# Patient Record
Sex: FEMALE | Race: WHITE | NOT HISPANIC OR LATINO
[De-identification: names, ages, dates, MRNs, and addresses within clinical notes are randomized per-mention and may not be internally consistent; named-entity substitution may affect disease eponyms.]

---

## 2019-10-09 ENCOUNTER — APPOINTMENT (OUTPATIENT)
Dept: ULTRASOUND IMAGING | Facility: CLINIC | Age: 49
End: 2019-10-09
Payer: COMMERCIAL

## 2019-10-09 ENCOUNTER — APPOINTMENT (OUTPATIENT)
Dept: MAMMOGRAPHY | Facility: CLINIC | Age: 49
End: 2019-10-09
Payer: COMMERCIAL

## 2019-10-09 PROCEDURE — 77067 SCR MAMMO BI INCL CAD: CPT

## 2019-10-09 PROCEDURE — 76641 ULTRASOUND BREAST COMPLETE: CPT | Mod: 50

## 2019-10-09 PROCEDURE — 77063 BREAST TOMOSYNTHESIS BI: CPT

## 2020-11-10 PROBLEM — Z00.00 ENCOUNTER FOR PREVENTIVE HEALTH EXAMINATION: Status: ACTIVE | Noted: 2020-11-10

## 2020-11-12 ENCOUNTER — APPOINTMENT (OUTPATIENT)
Dept: ORTHOPEDIC SURGERY | Facility: CLINIC | Age: 50
End: 2020-11-12
Payer: COMMERCIAL

## 2020-11-12 VITALS
TEMPERATURE: 97.7 F | DIASTOLIC BLOOD PRESSURE: 71 MMHG | BODY MASS INDEX: 26.46 KG/M2 | HEIGHT: 64 IN | HEART RATE: 73 BPM | WEIGHT: 155 LBS | SYSTOLIC BLOOD PRESSURE: 110 MMHG

## 2020-11-12 DIAGNOSIS — Z78.9 OTHER SPECIFIED HEALTH STATUS: ICD-10-CM

## 2020-11-12 PROCEDURE — 73030 X-RAY EXAM OF SHOULDER: CPT | Mod: LT

## 2020-11-12 PROCEDURE — 99072 ADDL SUPL MATRL&STAF TM PHE: CPT

## 2020-11-12 PROCEDURE — 99203 OFFICE O/P NEW LOW 30 MIN: CPT

## 2020-11-12 RX ORDER — MELOXICAM 15 MG/1
15 TABLET ORAL DAILY
Qty: 30 | Refills: 1 | Status: ACTIVE | COMMUNITY
Start: 2020-11-12 | End: 1900-01-01

## 2020-11-12 NOTE — PHYSICAL EXAM
[de-identified] : - Constitutional: This is a female in no obvious distress.  \par - Psych: Patient is alert and oriented to person, place and time.  Patient has a normal mood and affect.\par - Cardiovascular: Normal pulses throughout the upper extremities.  No significant varicosities are noted in the upper extremities. \par - Neuro: Strength and sensation are intact throughout the upper extremities.  Patient has normal coordination.\par - Respiratory:  Patient exhibits no evidence of shortness of breath or difficulty breathing.\par - Skin: No rashes, lesions, or other abnormalities are noted in the upper extremities.\par ---\par \par Examination of her left shoulder demonstrates no focal swelling.  There is no localized tenderness along the AC joint.  She has a negative Neer sign.  She has a mildly positive Cordero sign.  She has full motion.  There is a negative drop arm test.  She is neurovascularly intact distally. [de-identified] : AP and Y radiographs of her left shoulder demonstrate no obvious arthritis but there is a small cyst in the distal clavicle.  There is no evidence of a subacromial spur or glenohumeral joint arthritis.

## 2020-11-12 NOTE — DISCUSSION/SUMMARY
[FreeTextEntry1] : She has findings consistent with left shoulder pain secondary to impingement syndrome.\par \par I had a discussion regarding today's visit, the diagnosis, and treatment recommendations / options.  We discussed either a cortisone injection or a course of anti-inflammatories and a home exercise program.  She agreed to initially try a course of anti-inflammatories and a home exercise program.  She was prescribed meloxicam 15 mg a day for the next 2 weeks or so.  She was warned about potential side effects, including GI.  She was also instructed on home exercise program.  If she is not improving, then she will follow-up for a cortisone injection.\par \par When she was leaving, she showed me her left forearm where she has what appears to be a small lipoma.  It is not bothering her.  I therefore recommended observation.  If it increases in size or she develops symptoms, then she will return to the office in this regard.. \par \par The patient has agreed to this plan of management and has expressed full understanding.  All questions were fully answered to the patient's satisfaction.\par \par Over 50% of the time spent with the patient was on counseling the patient on the above diagnosis, treatment plan and prognosis.\par \par

## 2020-11-12 NOTE — ADDENDUM
[FreeTextEntry1] : I, Quiana Keyes, acted solely as a scribe for Dr. Reeves on this date 11/12/2020.\par \par

## 2020-11-12 NOTE — END OF VISIT
[FreeTextEntry3] : All medical record entries made by the Scribe were at my, Dr. Reeves, direction and personally dictated by me on 11/12/2020. I have reviewed the chart and agree that the record accurately reflects my personal performances of the history, physical exam, assessment, and plan. I have also personally directed, reviewed, and agreed with the chart.\par \par

## 2021-01-06 ENCOUNTER — APPOINTMENT (OUTPATIENT)
Dept: ULTRASOUND IMAGING | Facility: CLINIC | Age: 51
End: 2021-01-06

## 2021-01-06 ENCOUNTER — APPOINTMENT (OUTPATIENT)
Dept: MAMMOGRAPHY | Facility: CLINIC | Age: 51
End: 2021-01-06
Payer: COMMERCIAL

## 2021-01-06 PROCEDURE — 76641 ULTRASOUND BREAST COMPLETE: CPT | Mod: 50

## 2021-01-06 PROCEDURE — 77067 SCR MAMMO BI INCL CAD: CPT

## 2021-01-06 PROCEDURE — 77063 BREAST TOMOSYNTHESIS BI: CPT

## 2021-05-17 ENCOUNTER — APPOINTMENT (OUTPATIENT)
Dept: ORTHOPEDIC SURGERY | Facility: CLINIC | Age: 51
End: 2021-05-17
Payer: COMMERCIAL

## 2021-05-17 VITALS — TEMPERATURE: 97.2 F | HEIGHT: 64 IN | WEIGHT: 155 LBS | BODY MASS INDEX: 26.46 KG/M2

## 2021-05-17 PROCEDURE — 99072 ADDL SUPL MATRL&STAF TM PHE: CPT

## 2021-05-17 PROCEDURE — 99214 OFFICE O/P EST MOD 30 MIN: CPT

## 2021-05-17 NOTE — DISCUSSION/SUMMARY
[FreeTextEntry1] : She has findings consistent with 6 months of right elbow pain secondary to lateral epicondylitis.\par \par I had a discussion regarding today's visit, the prognosis of this diagnosis and treatment recommendations and options.  At this time, I recommended icing, Voltaren gel, bracing at night with a carpal tunnel splint and a tennis elbow strap to wear during the day.  She deferred referral to physical therapy so she was instructed on a home exercise program.  We discussed a possible cortisone injection in the future if her symptoms do not improve after bracing. \par \par The patient has agreed to this plan of management and has expressed full understanding.  All questions were fully answered to the patient's satisfaction.\par \par My cumulative time spent on this patient's visit included: Preparation for the visit, review of the medical records, review of pertinent diagnostic studies, examination and counseling of the patient on the above diagnosis, treatment plan and prognosis, orders of diagnostic tests, medications and/or appropriate procedures and documentation in the medical records of today's visit.

## 2021-05-17 NOTE — PHYSICAL EXAM
[de-identified] : - Constitutional: This is a male female in no obvious distress.  \par - Psych: Patient is alert and oriented to person, place and time.  Patient has a normal mood and affect.\par - Cardiovascular: Normal pulses throughout the upper extremities.  No significant varicosities are noted in the upper extremities. \par - Neuro: Strength and sensation are intact throughout the upper extremities.  Patient has normal coordination.\par - Respiratory:  Patient exhibits no evidence of shortness of breath or difficulty breathing.\par - Skin: No rashes, lesions, or other abnormalities are noted in the upper extremities.\par \par ---\par \par Side: Right Elbow\par -  There is tenderness along the lateral epicondyle.\par -  There is no associated swelling.\par -  There is pain at the lateral epicondyle with resisted wrist extension.  \par -  There is no tenderness along the radial tunnel.  \par -  There is no tenderness along the lateral joint line.\par -  There is no evidence of elbow instability.  \par -  There is no tenderness along the medial epicondyle.  \par -  There is full motion as compared to the contralateral elbow.  \par -  There are no neurovascular deficits noted distally.  \par -  Examination of the shoulder and wrist and hand are within normal limits.

## 2021-05-17 NOTE — ADDENDUM
[FreeTextEntry1] : This note was written by Daniella Menjivar on 05/17/2021 acting solely as a scribe for Dr. Hadley Reeves.\par \par All medical record entries made by the scribe were at my, Dr. Hadley Reeves, direction and personally dictated by me on 05/17/2021. I have personally reviewed the chart and agree that the record accurately reflects my personal performance of the history, physical exam, assessment, and plan

## 2021-06-18 ENCOUNTER — NON-APPOINTMENT (OUTPATIENT)
Age: 51
End: 2021-06-18

## 2021-06-21 PROBLEM — M75.42 IMPINGEMENT SYNDROME OF LEFT SHOULDER: Status: ACTIVE | Noted: 2020-11-12

## 2021-06-28 ENCOUNTER — APPOINTMENT (OUTPATIENT)
Dept: ORTHOPEDIC SURGERY | Facility: CLINIC | Age: 51
End: 2021-06-28
Payer: COMMERCIAL

## 2021-06-28 VITALS — BODY MASS INDEX: 26.46 KG/M2 | TEMPERATURE: 97.5 F | HEIGHT: 64 IN | WEIGHT: 155 LBS

## 2021-06-28 DIAGNOSIS — M75.42 IMPINGEMENT SYNDROME OF LEFT SHOULDER: ICD-10-CM

## 2021-06-28 PROCEDURE — 20551 NJX 1 TENDON ORIGIN/INSJ: CPT | Mod: RT

## 2021-06-28 PROCEDURE — 99072 ADDL SUPL MATRL&STAF TM PHE: CPT

## 2021-06-28 PROCEDURE — 99214 OFFICE O/P EST MOD 30 MIN: CPT | Mod: 25

## 2021-06-28 RX ADMIN — BETAMETHASONE ACETATE AND BETAMETHASONE SODIUM PHOSPHATE 6MG/ML PRESERVATIVE FREE MG/ML: 3; 3 INJECTION, SUSPENSION EPIDURAL; INTRAMUSCULAR; INTRASPINAL; INTRATHECAL at 00:00

## 2021-06-28 RX ADMIN — Medication %: at 00:00

## 2021-06-28 NOTE — DISCUSSION/SUMMARY
[FreeTextEntry1] : I had a discussion regarding today's visit, the diagnosis and treatment recommendations and options.  We also discussed changes since the last visit.  At this time, I recommended a cortisone injection or physical therapy. She agreed to proceed with an injection today.  She does understand that this may not ultimately provide her with long-term relief of her symptoms.\par \par The patient has agreed to the above plan of management and has expressed full understanding.  All questions were fully answered to the patient's satisfaction.\par \par My cumulative time spent on today's visit was greater than 30 minutes and included: Preparation for the visit, review of the medical records, review of pertinent diagnostic studies, examination and counseling of the patient on the above diagnosis, treatment plan and prognosis, orders of diagnostic tests, medications and/or appropriate procedures and documentation in the medical records of today's visit.

## 2021-06-28 NOTE — HISTORY OF PRESENT ILLNESS
[FreeTextEntry1] : Follow-up regarding right elbow pain secondary to lateral epicondylitis.  See note from when she was seen in the office 6 weeks ago.  I recommended icing, Voltaren gel, bracing at night with a carpal tunnel splint and a tennis elbow strap to wear during the day.  She deferred referral to physical therapy.\par \par She returns today in follow-up.  She notes no significant improvement since she was last seen in the office. She has been wearing the brace during the day with relief and she has been wearing a wrist brace at night. She cannot fully extend her elbow. She has sharp shooting pain. She has increased nighttime pain. She rates her pain a 0 out of 10 at rest and a 9/10 with activity\par \par I saw her greater than 6 months ago regarding left shoulder impingement syndrome.  She was prescribed meloxicam 50 mg a day instructed on a home exercise program.

## 2021-06-28 NOTE — PHYSICAL EXAM
[de-identified] : - Constitutional: This is a male female in no obvious distress.  \par - Psych: Patient is alert and oriented to person, place and time.  Patient has a normal mood and affect.\par - Cardiovascular: Normal pulses throughout the upper extremities.  No significant varicosities are noted in the upper extremities. \par - Neuro: Strength and sensation are intact throughout the upper extremities.  Patient has normal coordination.\par - Respiratory:  Patient exhibits no evidence of shortness of breath or difficulty breathing.\par - Skin: No rashes, lesions, or other abnormalities are noted in the upper extremities.\par \par ---\par \par Side: Right Elbow\par -  There is tenderness along the lateral epicondyle.\par -  There is no associated swelling.\par -  There is pain at the lateral epicondyle with resisted wrist extension.  \par -  There is no tenderness along the radial tunnel.  \par -  There is no tenderness along the lateral joint line.\par -  There is no evidence of elbow instability.  \par -  There is no tenderness along the medial epicondyle.  \par -  There is full motion as compared to the contralateral elbow.  \par -  There are no neurovascular deficits noted distally.  \par -  Examination of the shoulder and wrist and hand are within normal limits.

## 2021-06-28 NOTE — ADDENDUM
[FreeTextEntry1] : This note was written by Dainella Menjivar on 06/28/2021 acting solely as a scribe for Dr. Hadley Reeves.\par \par All medical record entries made by the scribe were at my, Dr. Hadley Reeves, direction and personally dictated by me on 06/28/2021. I have personally reviewed the chart and agree that the record accurately reflects my personal performance of the history, physical exam, assessment, and plan

## 2021-06-28 NOTE — PROCEDURE
[FreeTextEntry1] : -  After a discussion of risks and benefits, the patient agreed to proceed with a cortisone injection.  \par -  Side: Right lateral epicondylar region.\par -  Medications injected: 1 cc of 1% Lidocaine and 1 cc of Celestone Soluspan, 6mg/cc, using sterile technique.\par -  Patient tolerated the procedure well, without complications.\par -  Immediate improvement of the symptoms, secondary to the anesthetic effects of the injection, was noted.\par -  Patient was told that the pain usually worsens for a day or two, and should then begin to improve.  \par -  Instructions: Patient was instructed on the use of ice, anti-inflammatory agents or Tylenol, and activity modification. \par -  Follow-up: Within 4-5 weeks to assess response to the injection.

## 2021-07-16 ENCOUNTER — NON-APPOINTMENT (OUTPATIENT)
Age: 51
End: 2021-07-16

## 2021-07-19 PROBLEM — M77.11 LATERAL EPICONDYLITIS OF RIGHT ELBOW: Status: ACTIVE | Noted: 2021-05-17

## 2021-07-26 ENCOUNTER — APPOINTMENT (OUTPATIENT)
Dept: ORTHOPEDIC SURGERY | Facility: CLINIC | Age: 51
End: 2021-07-26

## 2021-07-26 DIAGNOSIS — M77.11 LATERAL EPICONDYLITIS, RIGHT ELBOW: ICD-10-CM

## 2022-03-01 ENCOUNTER — APPOINTMENT (OUTPATIENT)
Dept: MAMMOGRAPHY | Facility: CLINIC | Age: 52
End: 2022-03-01
Payer: COMMERCIAL

## 2022-03-01 ENCOUNTER — APPOINTMENT (OUTPATIENT)
Dept: ULTRASOUND IMAGING | Facility: CLINIC | Age: 52
End: 2022-03-01
Payer: COMMERCIAL

## 2022-03-01 PROCEDURE — 76641 ULTRASOUND BREAST COMPLETE: CPT | Mod: 50

## 2022-03-01 PROCEDURE — 77066 DX MAMMO INCL CAD BI: CPT

## 2022-03-01 PROCEDURE — 77062 BREAST TOMOSYNTHESIS BI: CPT

## 2023-06-05 ENCOUNTER — TRANSCRIPTION ENCOUNTER (OUTPATIENT)
Age: 53
End: 2023-06-05

## 2023-06-05 ENCOUNTER — APPOINTMENT (OUTPATIENT)
Dept: MAMMOGRAPHY | Facility: CLINIC | Age: 53
End: 2023-06-05
Payer: COMMERCIAL

## 2023-06-05 ENCOUNTER — APPOINTMENT (OUTPATIENT)
Dept: ULTRASOUND IMAGING | Facility: CLINIC | Age: 53
End: 2023-06-05
Payer: COMMERCIAL

## 2023-06-05 PROCEDURE — 77067 SCR MAMMO BI INCL CAD: CPT

## 2023-06-05 PROCEDURE — 76641 ULTRASOUND BREAST COMPLETE: CPT | Mod: 50

## 2023-06-05 PROCEDURE — 77063 BREAST TOMOSYNTHESIS BI: CPT

## 2023-06-20 ENCOUNTER — APPOINTMENT (OUTPATIENT)
Dept: ULTRASOUND IMAGING | Facility: CLINIC | Age: 53
End: 2023-06-20
Payer: COMMERCIAL

## 2023-06-20 PROCEDURE — 76642 ULTRASOUND BREAST LIMITED: CPT | Mod: LT

## 2023-06-26 ENCOUNTER — APPOINTMENT (OUTPATIENT)
Dept: ULTRASOUND IMAGING | Facility: CLINIC | Age: 53
End: 2023-06-26
Payer: COMMERCIAL

## 2023-06-26 PROCEDURE — 19083 BX BREAST 1ST LESION US IMAG: CPT | Mod: LT

## 2023-06-26 PROCEDURE — 77065 DX MAMMO INCL CAD UNI: CPT | Mod: LT

## 2023-06-29 ENCOUNTER — NON-APPOINTMENT (OUTPATIENT)
Age: 53
End: 2023-06-29

## 2023-07-06 ENCOUNTER — APPOINTMENT (OUTPATIENT)
Dept: BREAST CENTER | Facility: CLINIC | Age: 53
End: 2023-07-06
Payer: COMMERCIAL

## 2023-07-06 VITALS
TEMPERATURE: 97.1 F | HEART RATE: 80 BPM | DIASTOLIC BLOOD PRESSURE: 71 MMHG | WEIGHT: 170 LBS | BODY MASS INDEX: 29.02 KG/M2 | HEIGHT: 64 IN | SYSTOLIC BLOOD PRESSURE: 104 MMHG

## 2023-07-06 DIAGNOSIS — Z80.0 FAMILY HISTORY OF MALIGNANT NEOPLASM OF DIGESTIVE ORGANS: ICD-10-CM

## 2023-07-06 PROCEDURE — 99203 OFFICE O/P NEW LOW 30 MIN: CPT

## 2023-07-06 NOTE — ASSESSMENT
[FreeTextEntry1] : PCP: Tamy Mar, DO\par \par Patient is a 53 year old female here today for consultation for L 1:00 intraductal papilloma.\par Pt denies any breast lesions, discharge or masses. No prior breast biopsies. \par \par 6/5/23 NFR, bilat sMMG and US: TC 6.8%. FG. No susp mass, microcals or other sign of malignancy is identified. No mmg change in either breast. US: R- no susp solid mass. Scattered subcentimeter cysts are present. L- At 1:00, 1cmfn a 4 x 4 x 4 mm hypoechoic nodule is identified, which appears mildly lobulated on these images. This finding is new compared to prior US. Further eval with targeted US rec. Rec addl imaging. BR0 \par \par 6/20/23 NFR, L US: 4 x 4 mm minimally angulated, not parallel, 1:00 N1 nodule, not previously observed and mammographically occult. Rec US guided bx is rec. BR4A \par \par 6/26/23 [facility], L US guided bx 1:00:  Intraductal papilloma. Concordant. Rec surgical or oncological management. \par \par Fhx: Stomach- mGrandmother age 75\par CBE: 38C, FCC, no discrete masses or lesions. Small bx site on left lateral breast. Minimal ecchymosis. No hematoma. No axillary or SC lymphadenopathy.\par Reviewed with MARY RAE  the results of  L  breast biopsy from [facility] on 6/26/2023 for 4 mm lesion seen only on Jones/S showing papilloma.  Discussed risk of upgrade is about 10%. The lesion was found incidentally and given small size most/all may have been removed with bx but only excision was ascertain this. Discussed options of 6 mos f.u Left u/s vs excisional bx (wire and localizer localilzed discussed).  Pt will get the 6 mos f.u u/s at this time and if changes mind and desires excision, will contact us sooner.  If obs, d/c'd prob q 6 mos u/s x2 years and then q year after. \par

## 2023-07-06 NOTE — DATA REVIEWED
[FreeTextEntry1] : 6/5/23 NFR, bilat sMMG and US: TC 6.8%. FG. No susp mass, microcals or other sign of malignancy is identified. No mmg change in either breast. US: R- no susp solid mass. Scattered subcentimeter cysts are present. L- At 1:00, 1cmfn a 4 x 4 x 4 mm hypoechoic nodule is identified, which appears mildly lobulated on these images. This finding is new compared to prior US. Further eval with targeted US rec. Rec addl imaging. BR0 \par \par 6/20/23 NFR, L US: 4 x 4 mm minimally angulated, not parallel, 1:00 N1 nodule, not previously observed and mammographically occult. Rec US guided bx is rec. BR4A \par \par 6/26/23 [facility] US guided bx 1:00:  Intraductal papilloma. Concordant. Rec surgical or oncological management.

## 2023-07-06 NOTE — HISTORY OF PRESENT ILLNESS
[FreeTextEntry1] : PCP: Tamy Mar, DO\par \par Patient is a 53 year old female here today for consultation for L 1:00 intraductal papilloma.\par Pt denies any breast lesions, discharge or masses. No prior breast biopsies. \par \par 6/5/23 NFR, bilat sMMG and US: TC 6.8%. FG. No susp mass, microcals or other sign of malignancy is identified. No mmg change in either breast. US: R- no susp solid mass. Scattered subcentimeter cysts are present. L- At 1:00, 1cmfn a 4 x 4 x 4 mm hypoechoic nodule is identified, which appears mildly lobulated on these images. This finding is new compared to prior US. Further eval with targeted US rec. Rec addl imaging. BR0 \par \par 6/20/23 NFR, L US: 4 x 4 mm minimally angulated, not parallel, 1:00 N1 nodule, not previously observed and mammographically occult. Rec US guided bx is rec. BR4A \par \par 6/26/23 [facility] US guided bx 1:00:  Intraductal papilloma. Concordant. Rec surgical or oncological management. \par \par Fhx: Stomach- mGrandmother age 75

## 2023-07-06 NOTE — PHYSICAL EXAM
[Normocephalic] : normocephalic [Atraumatic] : atraumatic [Supple] : supple [No Supraclavicular Adenopathy] : no supraclavicular adenopathy [No Thyromegaly] : no thyromegaly [Examined in the supine and seated position] : examined in the supine and seated position [Symmetrical] : symmetrical [Bra Size: ___] : Bra Size: [unfilled] [No dominant masses] : no dominant masses in right breast  [No dominant masses] : no dominant masses left breast [No Nipple Retraction] : no left nipple retraction [No Nipple Discharge] : no left nipple discharge [No Axillary Lymphadenopathy] : no left axillary lymphadenopathy [No Edema] : no edema [Full ROM] : full range of motion [No Swelling] : no swelling [No Rashes] : no rashes [No Ulceration] : no ulceration

## 2023-07-06 NOTE — PAST MEDICAL HISTORY
[Menarche Age ____] : age at menarche was [unfilled] [Total Preg ___] : G[unfilled] [Living ___] : Living: [unfilled] [Age At Live Birth ___] : Age at live birth: [unfilled] [History of Hormone Replacement Treatment] : has no history of hormone replacement treatment [FreeTextEntry5] : hysterectomy @ 42 yrs old

## 2023-07-06 NOTE — CONSULT LETTER
[Dear  ___] : Dear  [unfilled], [Consult Letter:] : I had the pleasure of evaluating your patient, [unfilled]. [Please see my note below.] : Please see my note below. [Consult Closing:] : Thank you very much for allowing me to participate in the care of this patient.  If you have any questions, please do not hesitate to contact me. [Sincerely,] : Sincerely, [FreeTextEntry3] : Corina Dan MD

## 2023-10-30 ENCOUNTER — RESULT REVIEW (OUTPATIENT)
Age: 53
End: 2023-10-30

## 2023-10-30 ENCOUNTER — APPOINTMENT (OUTPATIENT)
Dept: ULTRASOUND IMAGING | Facility: CLINIC | Age: 53
End: 2023-10-30
Payer: COMMERCIAL

## 2023-10-30 PROCEDURE — 77065 DX MAMMO INCL CAD UNI: CPT | Mod: 1L,LT

## 2023-10-30 PROCEDURE — 19281 PERQ DEVICE BREAST 1ST IMAG: CPT | Mod: 1L,LT

## 2023-10-30 PROCEDURE — 19285Z: CUSTOM | Mod: 1L

## 2023-11-01 ENCOUNTER — RESULT REVIEW (OUTPATIENT)
Age: 53
End: 2023-11-01

## 2023-11-01 ENCOUNTER — APPOINTMENT (OUTPATIENT)
Dept: BREAST CENTER | Facility: CLINIC | Age: 53
End: 2023-11-01

## 2023-11-02 ENCOUNTER — RESULT REVIEW (OUTPATIENT)
Age: 53
End: 2023-11-02

## 2023-11-06 ENCOUNTER — APPOINTMENT (OUTPATIENT)
Dept: BREAST CENTER | Facility: CLINIC | Age: 53
End: 2023-11-06
Payer: COMMERCIAL

## 2023-11-06 VITALS
HEIGHT: 64 IN | TEMPERATURE: 97.3 F | HEART RATE: 85 BPM | SYSTOLIC BLOOD PRESSURE: 104 MMHG | DIASTOLIC BLOOD PRESSURE: 69 MMHG | BODY MASS INDEX: 29.02 KG/M2 | WEIGHT: 170 LBS

## 2023-11-06 PROCEDURE — 99024 POSTOP FOLLOW-UP VISIT: CPT

## 2024-01-15 ENCOUNTER — APPOINTMENT (OUTPATIENT)
Dept: BREAST CENTER | Facility: CLINIC | Age: 54
End: 2024-01-15

## 2024-01-15 ENCOUNTER — APPOINTMENT (OUTPATIENT)
Dept: BREAST CENTER | Facility: CLINIC | Age: 54
End: 2024-01-15
Payer: COMMERCIAL

## 2024-01-15 VITALS
HEART RATE: 64 BPM | WEIGHT: 160 LBS | OXYGEN SATURATION: 89 % | SYSTOLIC BLOOD PRESSURE: 118 MMHG | DIASTOLIC BLOOD PRESSURE: 80 MMHG | HEIGHT: 64 IN | BODY MASS INDEX: 27.31 KG/M2

## 2024-01-15 DIAGNOSIS — D24.2 BENIGN NEOPLASM OF LEFT BREAST: ICD-10-CM

## 2024-01-15 PROCEDURE — 99024 POSTOP FOLLOW-UP VISIT: CPT

## 2024-01-15 NOTE — PHYSICAL EXAM
[de-identified] : Left lateral incision intact and healing well. No evidence of cellulitis, no hematoma.

## 2024-01-15 NOTE — ASSESSMENT
[FreeTextEntry1] : PCP: Tamy Mar DO  Patient is a 53-year-old female here today for post op visit s/p excision biopsy for removal of L 1:00 intraductal papilloma. 11/1/23 Surgical path: Residual foci of benign intraductal papilloma focally sclerosed, largest focus is 0.3cm. Dense fibrous capsule surrounding one focus of papilloma abuts the posterior margin; Inked surface is free of papilloma. Proliferative and nonproliferative fibrocystic changes including focal usual ductal hyperplasia, benign cysts with apocrine metaplasia, and sclerosing adenosis. Patchy stromal fibrosis and focal pseudoangiomatous stromal hyperplasia ("PASH"). Calcifications present within papilloma and uninvolved ducts. Previous bx changes including clip material and fibrosis.  Feeling well, no hematoma, no cellulitis. She notes "hardness" to surgical site at incision.   6/5/23 NFR, bilat sMMG and US: TC 6.8%. FG. No susp mass, microcals or other sign of malignancy is identified. No mmg change in either breast. US: R- no susp solid mass. Scattered subcentimeter cysts are present. L- At 1:00, 1cmfn a 4 x 4 x 4 mm hypoechoic nodule is identified, which appears mildly lobulated on these images. This finding is new compared to prior US. Further eval with targeted US rec. Rec addl imaging. BR0  6/20/23 NFR, L US: 4 x 4 mm minimally angulated, not parallel, 1:00 N1 nodule, not previously observed and mammographically occult. Rec US guided bx is rec. BR4A  6/26/23 [facility] US guided bx 1:00: Intraductal papilloma. Concordant. Rec surgical or oncological management.  Fhx: Stomach- mGrandmother age 75  CBE: 38C, left lateral incision intact and healing well. No evidence of cellulitis, no hematoma. Reviewed "hardness" is part of normal healing process. Can massage area and utilize vitamin E. Due for 6 mos post op mmg 5/24, also due for bilat sMMG 6/24. Orders placed for both, will call with results, if wnl, follow up as needed.

## 2024-01-15 NOTE — HISTORY OF PRESENT ILLNESS
[FreeTextEntry1] : PCP: Tamy Mar DO  Patient is a 53-year-old female here today for post op visit s/p excision biopsy for removal of L 1:00 intraductal papilloma. 11/1/23 Surgical path: Residual foci of benign intraductal papilloma focally sclerosed, largest focus is 0.3cm. Dense fibrous capsule surrounding one focus of papilloma abuts the posterior margin; Inked surface is free of papilloma. Proliferative and nonproliferative fibrocystic changes including focal usual ductal hyperplasia, benign cysts with apocrine metaplasia, and sclerosing adenosis. Patchy stromal fibrosis and focal pseudoangiomatous stromal hyperplasia ("PASH"). Calcifications present within papilloma and uninvolved ducts. Previous bx changes including clip material and fibrosis.  Feeling well, no hematoma, no cellulitis. She notes "hardness" to surgical site at incision.   6/5/23 NFR, bilat sMMG and US: TC 6.8%. FG. No susp mass, microcals or other sign of malignancy is identified. No mmg change in either breast. US: R- no susp solid mass. Scattered subcentimeter cysts are present. L- At 1:00, 1cmfn a 4 x 4 x 4 mm hypoechoic nodule is identified, which appears mildly lobulated on these images. This finding is new compared to prior US. Further eval with targeted US rec. Rec addl imaging. BR0  6/20/23 NFR, L US: 4 x 4 mm minimally angulated, not parallel, 1:00 N1 nodule, not previously observed and mammographically occult. Rec US guided bx is rec. BR4A  6/26/23 [facility] US guided bx 1:00: Intraductal papilloma. Concordant. Rec surgical or oncological management.  Fhx: Stomach- mGrandmother age 75

## 2024-05-15 ENCOUNTER — APPOINTMENT (OUTPATIENT)
Dept: ULTRASOUND IMAGING | Facility: CLINIC | Age: 54
End: 2024-05-15
Payer: COMMERCIAL

## 2024-05-15 ENCOUNTER — APPOINTMENT (OUTPATIENT)
Dept: MAMMOGRAPHY | Facility: CLINIC | Age: 54
End: 2024-05-15
Payer: COMMERCIAL

## 2024-05-15 ENCOUNTER — RESULT REVIEW (OUTPATIENT)
Age: 54
End: 2024-05-15

## 2024-05-15 PROCEDURE — 77062 BREAST TOMOSYNTHESIS BI: CPT

## 2024-05-15 PROCEDURE — 77066 DX MAMMO INCL CAD BI: CPT

## 2024-05-15 PROCEDURE — 76641 ULTRASOUND BREAST COMPLETE: CPT | Mod: 50

## 2024-05-21 ENCOUNTER — NON-APPOINTMENT (OUTPATIENT)
Age: 54
End: 2024-05-21

## 2024-05-21 DIAGNOSIS — R92.8 OTHER ABNORMAL AND INCONCLUSIVE FINDINGS ON DIAGNOSTIC IMAGING OF BREAST: ICD-10-CM

## 2025-02-19 ENCOUNTER — RESULT REVIEW (OUTPATIENT)
Age: 55
End: 2025-02-19

## 2025-02-19 ENCOUNTER — APPOINTMENT (OUTPATIENT)
Dept: MAMMOGRAPHY | Facility: CLINIC | Age: 55
End: 2025-02-19

## 2025-02-19 ENCOUNTER — APPOINTMENT (OUTPATIENT)
Dept: ULTRASOUND IMAGING | Facility: CLINIC | Age: 55
End: 2025-02-19
Payer: COMMERCIAL

## 2025-02-19 PROCEDURE — 76642 ULTRASOUND BREAST LIMITED: CPT | Mod: RT

## 2025-02-28 ENCOUNTER — NON-APPOINTMENT (OUTPATIENT)
Age: 55
End: 2025-02-28

## 2025-03-13 ENCOUNTER — APPOINTMENT (OUTPATIENT)
Dept: OBGYN | Facility: CLINIC | Age: 55
End: 2025-03-13
Payer: COMMERCIAL

## 2025-03-13 VITALS
HEIGHT: 64 IN | SYSTOLIC BLOOD PRESSURE: 139 MMHG | WEIGHT: 160 LBS | BODY MASS INDEX: 27.31 KG/M2 | DIASTOLIC BLOOD PRESSURE: 76 MMHG

## 2025-03-13 DIAGNOSIS — Z01.419 ENCOUNTER FOR GYNECOLOGICAL EXAMINATION (GENERAL) (ROUTINE) W/OUT ABNORMAL FINDINGS: ICD-10-CM

## 2025-03-13 DIAGNOSIS — R92.8 OTHER ABNORMAL AND INCONCLUSIVE FINDINGS ON DIAGNOSTIC IMAGING OF BREAST: ICD-10-CM

## 2025-03-13 PROCEDURE — 99386 PREV VISIT NEW AGE 40-64: CPT

## 2025-03-20 LAB
CYTOLOGY CVX/VAG DOC THIN PREP: ABNORMAL
HPV HIGH+LOW RISK DNA PNL CVX: NOT DETECTED

## 2025-03-26 ENCOUNTER — APPOINTMENT (OUTPATIENT)
Dept: RADIOLOGY | Facility: CLINIC | Age: 55
End: 2025-03-26
Payer: COMMERCIAL

## 2025-03-26 PROCEDURE — 77080 DXA BONE DENSITY AXIAL: CPT

## 2025-05-30 ENCOUNTER — NON-APPOINTMENT (OUTPATIENT)
Age: 55
End: 2025-05-30

## 2025-06-20 ENCOUNTER — NON-APPOINTMENT (OUTPATIENT)
Age: 55
End: 2025-06-20